# Patient Record
Sex: MALE | Race: WHITE | Employment: STUDENT | URBAN - METROPOLITAN AREA
[De-identification: names, ages, dates, MRNs, and addresses within clinical notes are randomized per-mention and may not be internally consistent; named-entity substitution may affect disease eponyms.]

---

## 2021-09-05 NOTE — ED QUICK NOTES
Pt mother arrived to ED. Discharge instructions reviewed with pt. Pt denies any further questions at this time. Pt stable with a steady gait at time of discharge.

## 2021-09-06 NOTE — ED PROVIDER NOTES
Patient Seen in: [facility] AND [facility] Emergency Department    History   Patient presents with:  Alcohol Intoxication      HPI    Patient presents to the ED complaining of nausea and vomiting after drinking \"too much\" alcohol tonight.   Patient was brought germicidal wipes following the exam.     Physical Exam  Vitals and nursing note reviewed. Constitutional:       General: He is not in acute distress. Appearance: He is well-developed. HENT:      Head: Normocephalic and atraumatic.    Neck:      [address]  [phone]    Schedule an appointment as soon as possible for a visit in 3 days        Medications Prescribed:  There are no discharge medications for this patient.

## 2021-10-19 ENCOUNTER — HOSPITAL ENCOUNTER (EMERGENCY)
Facility: HOSPITAL | Age: 21
Discharge: HOME | End: 2021-10-20
Payer: COMMERCIAL

## 2021-10-19 ENCOUNTER — APPOINTMENT (OUTPATIENT)
Dept: GENERAL RADIOLOGY | Facility: HOSPITAL | Age: 21
End: 2021-10-19
Payer: COMMERCIAL

## 2021-10-19 DIAGNOSIS — S93.402A MILD SPRAIN OF LEFT ANKLE, INITIAL ENCOUNTER: Primary | ICD-10-CM

## 2021-10-19 PROCEDURE — 99283 EMERGENCY DEPT VISIT LOW MDM: CPT

## 2021-10-19 PROCEDURE — 73610 X-RAY EXAM OF ANKLE: CPT

## 2021-10-20 VITALS
OXYGEN SATURATION: 99 % | BODY MASS INDEX: 20.51 KG/M2 | SYSTOLIC BLOOD PRESSURE: 128 MMHG | TEMPERATURE: 98 F | RESPIRATION RATE: 20 BRPM | HEART RATE: 80 BPM | DIASTOLIC BLOOD PRESSURE: 75 MMHG | WEIGHT: 165 LBS | HEIGHT: 75 IN

## 2021-10-20 NOTE — ED QUICK NOTES
PT C/O LEFT ANKLE PAIN. PT STATES HE ROLLED HIS ANKLE WHILE PLAYING BASKETBALL. NO SWELLING NOTED. CMS INTACT.

## 2021-10-20 NOTE — ED PROVIDER NOTES
Patient Seen in: [facility] AND [facility] Emergency Department    History   Patient presents with:  Leg or Foot Injury    Stated Complaint: ankle injury    HPI     24year old male who presents after an injury to the left ankle and foot with a twisting, inversi instability to anterior drawer. There is no notable deformity. There is no tenderness over the base of the fifth metatarsal. Achilles is palpated and intact functionally. There is no tenderness over the ipsilateral fibular head.  Full range of motion of the